# Patient Record
(demographics unavailable — no encounter records)

---

## 2025-01-14 NOTE — DISCUSSION/SUMMARY
[FreeTextEntry1] : EKG:NSR,LVH,firste dgree avb, continue toprol for YON;norvasc +telmisartan for HPTN;Crestor for HLD/calcium score. continue KCOL and check BMP for hypokalemia

## 2025-01-14 NOTE — PHYSICAL EXAM
[Well Developed] : well developed [Well Nourished] : well nourished [No Acute Distress] : no acute distress [Obese] : obese [Normal Conjunctiva] : normal conjunctiva [Normal Venous Pressure] : normal venous pressure [No Carotid Bruit] : no carotid bruit [Normal] : normal S1, S2, no murmur, no rub, no gallop [Normal S1, S2] : normal S1, S2 [No Murmur] : no murmur [No Rub] : no rub [No Gallop] : no gallop [Clear Lung Fields] : clear lung fields [Good Air Entry] : good air entry [No Respiratory Distress] : no respiratory distress  [Soft] : abdomen soft [Non Tender] : non-tender [Normal Bowel Sounds] : normal bowel sounds [Normal Gait] : normal gait [No Cyanosis] : no cyanosis [No Clubbing] : no clubbing [No Rash] : no rash [Moves all extremities] : moves all extremities [No Focal Deficits] : no focal deficits [Normal Speech] : normal speech [Alert and Oriented] : alert and oriented [Normal memory] : normal memory [de-identified] : O/W [de-identified] : trace bipedal edema

## 2025-04-09 NOTE — ASSESSMENT
[FreeTextEntry1] : * 70 y/o M with PMHX of HTN, BPH, balance problem, dizziness, BUSTILLO, elevated coronary artery calcium score, HLD, hypertrophic cardiomyopathy, mild aortic insufficiency, obesity, SAMM on CPAP, ventricular arrhythmia, being seen for follow up visit.   Available laboratory data reviewed and discussed with patient dated March 17 c/o URO office  Kidney function ok - eGFR 64  creatinine 1.21 CBC - ok  Blood pressure- acceptable Amlodipine 5 mg daily changed to 5 mg BID  New blood works ordered including PT/INR/APTT, ABO blood type  For URO procedure/HoLEP 5/1/2025

## 2025-04-09 NOTE — HISTORY OF PRESENT ILLNESS
[FreeTextEntry1] : 72 y/o M with PMHX of HTN, BPH, balance problem, dizziness, BUSTILLO, elevated coronary artery calcium score, HLD, hypertrophic cardiomyopathy, mild aortic insufficiency, obesity, SAMM on CPAP, ventricular arrhythmia, being seen for follow up visit.  LCV April 11, 2024  Patient saw URO/Dr Oliveira last March 17, 2025 - his urinary problem is getting worst: stream weaker, more hesitancy, more frequency and urgency - he is [facility]  Saw CARDIO/Dr Aguilera last January 2025 His BP at home ranges 120s-130s/65-70s  He saw NEURO/DR Garcia who is also working for pain mgt  He had colonoscopy 3 yrs ago

## 2025-04-09 NOTE — END OF VISIT
[FreeTextEntry3] : I, Dr. Ciro Matthew, personally performed the evaluation and management (E/M) services for this established patient who presents today with (a) new problem(s)/exacerbation of (an) existing condition(s). That E/M includes conducting the clinically appropriate interval history &/or exam, assessing all new/exacerbated conditions, and establishing a new plan of care. Today, my FRANCISCO, noted herewith, was here to observe my evaluation and management service for this new problem/exacerbated condition and follow the plan of care established by me going forward.

## 2025-04-24 NOTE — HISTORY OF PRESENT ILLNESS
[FreeTextEntry1] : No cp/sob he reduced metolazone to 1/2 tab daily because he's worried about Potassium he's using CPAP

## 2025-04-24 NOTE — DISCUSSION/SUMMARY
[FreeTextEntry1] : EKG:NSR,LAHB,First degree AVB,apc continue zaroxlyn( advised to follow dr solorio's recommendation) for leg edema and telmisartan + norvasc for HPTN; Potassium for hypokalemia;Crestor for HLD/calcium score.toprol for YON TTE: normal LVEF,mild-mod AR,mild MR,YON His cardiac status is stable enough to permit Prostate / surgery

## 2025-04-24 NOTE — PHYSICAL EXAM
[Well Developed] : well developed [Well Nourished] : well nourished [No Acute Distress] : no acute distress [Obese] : obese [Normal Conjunctiva] : normal conjunctiva [Normal Venous Pressure] : normal venous pressure [No Carotid Bruit] : no carotid bruit [Normal] : normal S1, S2, no murmur, no rub, no gallop [Normal S1, S2] : normal S1, S2 [No Murmur] : no murmur [No Rub] : no rub [No Gallop] : no gallop [Clear Lung Fields] : clear lung fields [Good Air Entry] : good air entry [No Respiratory Distress] : no respiratory distress  [Soft] : abdomen soft [Non Tender] : non-tender [Normal Bowel Sounds] : normal bowel sounds [Normal Gait] : normal gait [No Cyanosis] : no cyanosis [No Clubbing] : no clubbing [No Rash] : no rash [Moves all extremities] : moves all extremities [No Focal Deficits] : no focal deficits [Normal Speech] : normal speech [Alert and Oriented] : alert and oriented [Normal memory] : normal memory [de-identified] : O/W [de-identified] : trace bipedal edema

## 2025-05-28 NOTE — ASSESSMENT
[FreeTextEntry1] : Assessment: Mr. GEN MORENO is a 71 year old man with LUTS that are worsening, rising PVR, prostate volume of 84 cc, taking flomax 0.8 mg though not consistently. Symptoms most likely due to bladder outlet obstruction secondary to his enlarged prostate, although it is difficult to assess the role of bladder dysfunction, contributing to his symptoms. Given symptoms worsening over time, he is more strongly considering prostate debulking procedure.  We discussed different therapeutic options including TURP, PVP, HoLEP, Aquablation, Urolift therapy, and Rezum in addition to full continuation of medical therapy. Discussed the issues of incontinence, retrograde ejaculation, erectile dysfunction, irritative voiding symptoms, injury to urethra and bladder, persistence of irritative voiding symptoms, urethral stricture or bladder neck contracture, need for open surgery to repair the injury, erectile dysfunction and infertility. Given prostate size and low concern for retrograde ejaculation, recommend HoLEP.  Patient underwent HoLEP at [facility] on 5/1/25. Passed TOV POD-1. Pathology with 40 grams BPH. Doing well with strong stream, complete emptying, intermittent hematuria, resolved leakage.   - UA, UCx - RTC 2 months (IPSS, UA, PVR, PSA)

## 2025-05-28 NOTE — HISTORY OF PRESENT ILLNESS
[FreeTextEntry1] : 7/28/23: 69 yr old male presents with LUTS. Previously referred by Dr. Bhat. Patient has a 80 g prostate. He is most bothered by split stream, spraying and frequency. He has not consistently taken flomax, but most recently was taking 0.8 mg QD.   IPSS: 16, QOL 3 UA: negative PVR: 52 cc  PSA 2.75--7/2023 2/20/24: Returns for prostate ultrasound as he was unsure if he wanted to proceed with surgery previously. Prostate volume 84 cc, so relatively stable.  Urinary symptoms have gotten worse. Stream weaker, more hesitancy, more frequency and urgency. Currently taking flomax once daily. Feels he gets constipated from taking twice daily.  Had bladder ultrasound in 11/2023 with increased  cc  3/17/25: Here for f/u. Having increased LUTS, more bothered. Did start new medication for pre-diabetes that causes more frequent urination, but unsure of name. Regardless, wants to proceed with surgery. Previously discussed HoLEP.   PVR: 138 ml (LUTS)   5/28/25: Underwent HoLEP at [facility] on 5/1/25. Passed TOV POD-1. Doing well with strong stream, complete emptying, intermittent hematuria, resolved leakage.   IPSS: 3, QOL 0 PVR: 22 cc

## 2025-07-08 NOTE — PHYSICAL EXAM
[Well Developed] : well developed [Well Nourished] : well nourished [No Acute Distress] : no acute distress [Obese] : obese [Normal Conjunctiva] : normal conjunctiva [Normal Venous Pressure] : normal venous pressure [No Carotid Bruit] : no carotid bruit [Normal] : normal S1, S2, no murmur, no rub, no gallop [Normal S1, S2] : normal S1, S2 [No Murmur] : no murmur [No Rub] : no rub [No Gallop] : no gallop [Clear Lung Fields] : clear lung fields [Good Air Entry] : good air entry [No Respiratory Distress] : no respiratory distress  [Soft] : abdomen soft [Non Tender] : non-tender [Normal Bowel Sounds] : normal bowel sounds [Normal Gait] : normal gait [No Cyanosis] : no cyanosis [No Clubbing] : no clubbing [No Rash] : no rash [Moves all extremities] : moves all extremities [No Focal Deficits] : no focal deficits [Normal Speech] : normal speech [Alert and Oriented] : alert and oriented [Normal memory] : normal memory [de-identified] : O/W [de-identified] : trace bipedal edema

## 2025-07-08 NOTE — DISCUSSION/SUMMARY
[FreeTextEntry1] : EKG:NSR,APC continue kcl for hypokalemia nd repeat labs norvasc + telmisartan for HPTN; toprol for YON Crestor for HLD  needs to lose weight

## 2025-07-08 NOTE — HISTORY OF PRESENT ILLNESS
[FreeTextEntry1] : s/p  surgery-  no cp/sob- was in er with fever and labs with K=3.1 and elevated creatinine states no leg edema better since  surgery

## 2025-07-30 NOTE — ASSESSMENT
[FreeTextEntry1] : Assessment: Mr. GEN MORENO is a 71 year old man with LUTS that are worsening, rising PVR, prostate volume of 84 cc, taking flomax 0.8 mg though not consistently. Symptoms most likely due to bladder outlet obstruction secondary to his enlarged prostate, although it is difficult to assess the role of bladder dysfunction, contributing to his symptoms. Given symptoms worsening over time, he is more strongly considering prostate debulking procedure.  We discussed different therapeutic options including TURP, PVP, HoLEP, Aquablation, Urolift therapy, and Rezum in addition to full continuation of medical therapy. Discussed the issues of incontinence, retrograde ejaculation, erectile dysfunction, irritative voiding symptoms, injury to urethra and bladder, persistence of irritative voiding symptoms, urethral stricture or bladder neck contracture, need for open surgery to repair the injury, erectile dysfunction and infertility. Given prostate size and low concern for retrograde ejaculation, recommend HoLEP.  Patient underwent HoLEP at [facility] on 5/1/25. Passed TOV POD-1. Pathology with 40 grams BPH. Doing well with strong stream, complete emptying, intermittent hematuria, no significant leakage or need for pads.   - UA, UCx - RTC 2 months (IPSS, UA, PVR)

## 2025-07-30 NOTE — LETTER BODY
[Dear  ___] : Dear  [unfilled], [Courtesy Letter:] : I had the pleasure of seeing your patient, [unfilled], in my office today. [Please see my note below.] : Please see my note below. [Consult Closing:] : Thank you very much for allowing me to participate in the care of this patient.  If you have any questions, please do not hesitate to contact me. [Sincerely,] : Sincerely, [FreeTextEntry3] : Zhane Oliveira MD

## 2025-07-30 NOTE — PHYSICAL EXAM
[General Appearance - Alert] : alert [General Appearance - In No Acute Distress] : in no acute distress [Sclera] : the sclera and conjunctiva were normal [PERRL With Normal Accommodation] : pupils were equal in size, round, and reactive to light [Extraocular Movements] : extraocular movements were intact [Outer Ear] : the ears and nose were normal in appearance [Oropharynx] : the oropharynx was normal [Neck Appearance] : the appearance of the neck was normal [Neck Cervical Mass (___cm)] : no neck mass was observed [Jugular Venous Distention Increased] : there was no jugular-venous distention [Thyroid Diffuse Enlargement] : the thyroid was not enlarged [Thyroid Nodule] : there were no palpable thyroid nodules [Respiration, Rhythm And Depth] : normal respiratory rhythm and effort [Exaggerated Use Of Accessory Muscles For Inspiration] : no accessory muscle use [Auscultation Breath Sounds / Voice Sounds] : lungs were clear to auscultation bilaterally [Heart Rate And Rhythm] : heart rate was normal and rhythm regular [Heart Sounds] : normal S1 and S2 [Heart Sounds Gallop] : no gallops [Murmurs] : no murmurs [Heart Sounds Pericardial Friction Rub] : no pericardial rub [Veins - Varicosity Changes] : there were no varicosital changes [Bowel Sounds] : normal bowel sounds [Abdomen Soft] : soft [Abdomen Tenderness] : non-tender [Abdomen Mass (___ Cm)] : no abdominal mass palpated [FreeTextEntry1] : obese [No CVA Tenderness] : no ~M costovertebral angle tenderness [No Spinal Tenderness] : no spinal tenderness [Abnormal Walk] : normal gait [Involuntary Movements] : no involuntary movements were seen [Skin Color & Pigmentation] : normal skin color and pigmentation [Skin Turgor] : normal skin turgor [] : no rash [Cranial Nerves] : cranial nerves 2-12 were intact [No Focal Deficits] : no focal deficits [Affect] : the affect was normal [Mood] : the mood was normal

## 2025-07-30 NOTE — ASSESSMENT
[FreeTextEntry1] : Patient is a 72 yo M with CKD3, HTN, LVH, BPH, who presents for evaluation of CKD  CKD - 2/2 HTN nephrosclerosis, discussed needs to be conssitent with medications to prevent further deterioration. Patient agreeable and will stay on meds then get labs done - Full ckd labs on consistent medication - Increase water intake  MBD-CKD - at goal phos pth  Anemia-CKD - at goal hgb  HTN - continue meds as above  RTC in 3-5 months with labs prior

## 2025-07-30 NOTE — ASSESSMENT
[FreeTextEntry1] : Patient is a 70 yo M with CKD3, HTN, LVH, BPH, who presents for evaluation of CKD  CKD - 2/2 HTN nephrosclerosis, discussed needs to be conssitent with medications to prevent further deterioration. Patient agreeable and will stay on meds then get labs done - Full ckd labs on consistent medication - Increase water intake  MBD-CKD - at goal phos pth  Anemia-CKD - at goal hgb  HTN - continue meds as above  RTC in 3-5 months with labs prior

## 2025-07-30 NOTE — HISTORY OF PRESENT ILLNESS
[FreeTextEntry1] : Usually follows with Tiff, presenting for 2nd opinion. Was taking metolazone 5mg, was told to go up to ten but instead went down to 2.5mg, started to gain fluid 5lbs, saw pcp and switched from metolazone to spironolactone 25mg and torsemide 20mg urinating excellent with the combination with improvement in le edema  Seems like spironolactone is an excellent choice for him, aldosterone is not very elevated but renin low so I would say mild hyperaldo, mildly low potassium baseline on thiazide diuretic, agree with daily spironolactone 25mg with torsemide as needed for LE edema.    Current meds: amlodipine 5mg, metoprolol 50mg once a day, potassium chloride 20meq (was taking BID, should only take once a day), spironolactone 25mg daily, torsemide 20 for swelling, telmisartan 40mg

## 2025-07-30 NOTE — HISTORY OF PRESENT ILLNESS
[FreeTextEntry1] : 7/28/23: 69 yr old male presents with LUTS. Previously referred by Dr. Bhat. Patient has a 80 g prostate. He is most bothered by split stream, spraying and frequency. He has not consistently taken flomax, but most recently was taking 0.8 mg QD.   IPSS: 16, QOL 3 UA: negative PVR: 52 cc  PSA 2.75--7/2023 2/20/24: Returns for prostate ultrasound as he was unsure if he wanted to proceed with surgery previously. Prostate volume 84 cc, so relatively stable.  Urinary symptoms have gotten worse. Stream weaker, more hesitancy, more frequency and urgency. Currently taking flomax once daily. Feels he gets constipated from taking twice daily.  Had bladder ultrasound in 11/2023 with increased  cc  3/17/25: Here for f/u. Having increased LUTS, more bothered. Did start new medication for pre-diabetes that causes more frequent urination, but unsure of name. Regardless, wants to proceed with surgery. Previously discussed HoLEP.   PVR: 138 ml (LUTS)   5/28/25: Underwent HoLEP at [facility] on 5/1/25. Passed TOV POD-1. Doing well with strong stream, complete emptying, intermittent hematuria, resolved leakage.   IPSS: 3, QOL 0 PVR: 22 cc  7/30/25: Generally doing well, has had some adjustments in diuretics with varying affects on urinary frequency and urgency. Strong stream, complete emptying, no significant urgency or leakage.  IPSS 3, PVR 58 cc